# Patient Record
Sex: MALE | Race: WHITE | NOT HISPANIC OR LATINO | Employment: FULL TIME | URBAN - METROPOLITAN AREA
[De-identification: names, ages, dates, MRNs, and addresses within clinical notes are randomized per-mention and may not be internally consistent; named-entity substitution may affect disease eponyms.]

---

## 2020-06-04 ENCOUNTER — NURSE TRIAGE (OUTPATIENT)
Dept: OTHER | Facility: OTHER | Age: 25
End: 2020-06-04

## 2020-06-04 DIAGNOSIS — Z20.828 EXPOSURE TO SARS-ASSOCIATED CORONAVIRUS: ICD-10-CM

## 2020-06-04 DIAGNOSIS — Z20.828 EXPOSURE TO SARS-ASSOCIATED CORONAVIRUS: Primary | ICD-10-CM

## 2020-06-04 PROCEDURE — U0003 INFECTIOUS AGENT DETECTION BY NUCLEIC ACID (DNA OR RNA); SEVERE ACUTE RESPIRATORY SYNDROME CORONAVIRUS 2 (SARS-COV-2) (CORONAVIRUS DISEASE [COVID-19]), AMPLIFIED PROBE TECHNIQUE, MAKING USE OF HIGH THROUGHPUT TECHNOLOGIES AS DESCRIBED BY CMS-2020-01-R: HCPCS

## 2020-06-06 LAB — SARS-COV-2 RNA SPEC QL NAA+PROBE: NOT DETECTED

## 2020-06-14 ENCOUNTER — NURSE TRIAGE (OUTPATIENT)
Dept: OTHER | Facility: OTHER | Age: 25
End: 2020-06-14

## 2020-06-14 ENCOUNTER — APPOINTMENT (OUTPATIENT)
Dept: URGENT CARE | Age: 25
End: 2020-06-14
Payer: COMMERCIAL

## 2020-06-14 DIAGNOSIS — Z20.828 EXPOSURE TO SARS-ASSOCIATED CORONAVIRUS: Primary | ICD-10-CM

## 2020-06-14 DIAGNOSIS — Z20.828 EXPOSURE TO SARS-ASSOCIATED CORONAVIRUS: ICD-10-CM

## 2020-06-14 PROCEDURE — U0003 INFECTIOUS AGENT DETECTION BY NUCLEIC ACID (DNA OR RNA); SEVERE ACUTE RESPIRATORY SYNDROME CORONAVIRUS 2 (SARS-COV-2) (CORONAVIRUS DISEASE [COVID-19]), AMPLIFIED PROBE TECHNIQUE, MAKING USE OF HIGH THROUGHPUT TECHNOLOGIES AS DESCRIBED BY CMS-2020-01-R: HCPCS

## 2020-06-15 ENCOUNTER — TELEPHONE (OUTPATIENT)
Dept: OTHER | Facility: OTHER | Age: 25
End: 2020-06-15

## 2020-06-15 LAB — SARS-COV-2 RNA SPEC QL NAA+PROBE: DETECTED

## 2023-09-28 ENCOUNTER — APPOINTMENT (EMERGENCY)
Dept: RADIOLOGY | Facility: HOSPITAL | Age: 28
End: 2023-09-28
Payer: COMMERCIAL

## 2023-09-28 ENCOUNTER — APPOINTMENT (EMERGENCY)
Dept: MRI IMAGING | Facility: HOSPITAL | Age: 28
End: 2023-09-28
Payer: COMMERCIAL

## 2023-09-28 ENCOUNTER — HOSPITAL ENCOUNTER (EMERGENCY)
Facility: HOSPITAL | Age: 28
Discharge: HOME | End: 2023-09-29
Attending: EMERGENCY MEDICINE
Payer: COMMERCIAL

## 2023-09-28 DIAGNOSIS — C71.9 ANAPLASTIC ASTROCYTOMA (HCC): ICD-10-CM

## 2023-09-28 DIAGNOSIS — R53.1 ACUTE RIGHT-SIDED WEAKNESS: ICD-10-CM

## 2023-09-28 DIAGNOSIS — R47.89 WORD FINDING DIFFICULTY: ICD-10-CM

## 2023-09-28 DIAGNOSIS — R26.0 ATAXIC GAIT: Primary | ICD-10-CM

## 2023-09-28 DIAGNOSIS — R20.0 RIGHT ARM NUMBNESS: ICD-10-CM

## 2023-09-28 LAB
ALBUMIN SERPL BCP-MCNC: 4.8 G/DL (ref 3.5–5)
ALP SERPL-CCNC: 64 U/L (ref 34–104)
ALT SERPL W P-5'-P-CCNC: 20 U/L (ref 7–52)
ANION GAP SERPL CALCULATED.3IONS-SCNC: 7 MMOL/L
AST SERPL W P-5'-P-CCNC: 20 U/L (ref 13–39)
BASOPHILS # BLD AUTO: 0.05 THOUSANDS/ÂΜL (ref 0–0.1)
BASOPHILS NFR BLD AUTO: 1 % (ref 0–1)
BILIRUB SERPL-MCNC: 0.54 MG/DL (ref 0.2–1)
BUN SERPL-MCNC: 19 MG/DL (ref 5–25)
CALCIUM SERPL-MCNC: 9.9 MG/DL (ref 8.4–10.2)
CARDIAC TROPONIN I PNL SERPL HS: <2 NG/L
CHLORIDE SERPL-SCNC: 104 MMOL/L (ref 96–108)
CO2 SERPL-SCNC: 29 MMOL/L (ref 21–32)
CREAT SERPL-MCNC: 1.34 MG/DL (ref 0.6–1.3)
EOSINOPHIL # BLD AUTO: 0.23 THOUSAND/ÂΜL (ref 0–0.61)
EOSINOPHIL NFR BLD AUTO: 4 % (ref 0–6)
ERYTHROCYTE [DISTWIDTH] IN BLOOD BY AUTOMATED COUNT: 11.8 % (ref 11.6–15.1)
GFR SERPL CREATININE-BSD FRML MDRD: 70 ML/MIN/1.73SQ M
GLUCOSE SERPL-MCNC: 111 MG/DL (ref 65–140)
HCT VFR BLD AUTO: 42.6 % (ref 36.5–49.3)
HGB BLD-MCNC: 14.8 G/DL (ref 12–17)
IMM GRANULOCYTES # BLD AUTO: 0 THOUSAND/UL (ref 0–0.2)
IMM GRANULOCYTES NFR BLD AUTO: 0 % (ref 0–2)
LIPASE SERPL-CCNC: 7 U/L (ref 11–82)
LYMPHOCYTES # BLD AUTO: 1.79 THOUSANDS/ÂΜL (ref 0.6–4.47)
LYMPHOCYTES NFR BLD AUTO: 28 % (ref 14–44)
MCH RBC QN AUTO: 31 PG (ref 26.8–34.3)
MCHC RBC AUTO-ENTMCNC: 34.7 G/DL (ref 31.4–37.4)
MCV RBC AUTO: 89 FL (ref 82–98)
MONOCYTES # BLD AUTO: 0.58 THOUSAND/ÂΜL (ref 0.17–1.22)
MONOCYTES NFR BLD AUTO: 9 % (ref 4–12)
NEUTROPHILS # BLD AUTO: 3.81 THOUSANDS/ÂΜL (ref 1.85–7.62)
NEUTS SEG NFR BLD AUTO: 58 % (ref 43–75)
NRBC BLD AUTO-RTO: 0 /100 WBCS
PLATELET # BLD AUTO: 235 THOUSANDS/UL (ref 149–390)
PMV BLD AUTO: 10.8 FL (ref 8.9–12.7)
POTASSIUM SERPL-SCNC: 3.9 MMOL/L (ref 3.5–5.3)
PROT SERPL-MCNC: 7.3 G/DL (ref 6.4–8.4)
RBC # BLD AUTO: 4.78 MILLION/UL (ref 3.88–5.62)
SODIUM SERPL-SCNC: 140 MMOL/L (ref 135–147)
TSH SERPL DL<=0.05 MIU/L-ACNC: 1.53 UIU/ML (ref 0.45–4.5)
WBC # BLD AUTO: 6.46 THOUSAND/UL (ref 4.31–10.16)

## 2023-09-28 PROCEDURE — 36415 COLL VENOUS BLD VENIPUNCTURE: CPT

## 2023-09-28 PROCEDURE — 80053 COMPREHEN METABOLIC PANEL: CPT

## 2023-09-28 PROCEDURE — 85025 COMPLETE CBC W/AUTO DIFF WBC: CPT

## 2023-09-28 PROCEDURE — 70553 MRI BRAIN STEM W/O & W/DYE: CPT

## 2023-09-28 PROCEDURE — A9585 GADOBUTROL INJECTION: HCPCS

## 2023-09-28 PROCEDURE — 99285 EMERGENCY DEPT VISIT HI MDM: CPT

## 2023-09-28 PROCEDURE — 93005 ELECTROCARDIOGRAM TRACING: CPT

## 2023-09-28 PROCEDURE — 71046 X-RAY EXAM CHEST 2 VIEWS: CPT

## 2023-09-28 PROCEDURE — 83690 ASSAY OF LIPASE: CPT

## 2023-09-28 PROCEDURE — 84443 ASSAY THYROID STIM HORMONE: CPT

## 2023-09-28 PROCEDURE — 84484 ASSAY OF TROPONIN QUANT: CPT

## 2023-09-28 PROCEDURE — 99285 EMERGENCY DEPT VISIT HI MDM: CPT | Performed by: EMERGENCY MEDICINE

## 2023-09-28 RX ORDER — GADOBUTROL 604.72 MG/ML
9 INJECTION INTRAVENOUS
Status: COMPLETED | OUTPATIENT
Start: 2023-09-28 | End: 2023-09-28

## 2023-09-28 RX ADMIN — GADOBUTROL 9 ML: 604.72 INJECTION INTRAVENOUS at 20:42

## 2023-09-28 NOTE — ED PROVIDER NOTES
History  Chief Complaint   Patient presents with   • Chest Pain     Patient reports midsternal stabbing CP and right sided weakness in arm and leg since Monday. Other neuro intact     Gil Budmyrna is a 25 yom with history of prior grade 3, left sided anaplastic astrocytoma, s/p resection, chemotherapy, and radiation, with stable scans since 2016(and no further radiation or chemo since that time) who presents with cc loss of balance. States that for the past month, had increase in frequency in his usual headaches(accompanied by nausea). 5 days ago, developed significant balance issues, accompanied by articulation difficulty, right upper extremity weakness and numbness, and RLE weakness. Also endorses sharp, substernal chest pain which lasts a few seconds at a time, is random, not worse with movement or deep inspiration, not accompanied by diaphoresis, abdominal pain, nausea, vomiting, dyspnea, or unilateral calf pain or swelling, not currently present. Denies fever, chills, dizziness, tinnitus, dysphagia, neck pain or swelling, back pain, palpitations, diarrhea, constipation, urinary retention, bowel incontinence, rashes, recent travel, recent sick contacts, or falls/head injuries. No personal or family history of ACS, CVA, DVT, or PE. None       History reviewed. No pertinent past medical history. History reviewed. No pertinent surgical history. History reviewed. No pertinent family history. I have reviewed and agree with the history as documented. E-Cigarette/Vaping     E-Cigarette/Vaping Substances     Social History     Tobacco Use   • Smoking status: Never   • Smokeless tobacco: Never        Review of Systems   Constitutional: Negative. Negative for appetite change, chills, diaphoresis, fatigue, fever and unexpected weight change. HENT: Negative. Negative for congestion, facial swelling, rhinorrhea, sneezing, sore throat, tinnitus, trouble swallowing and voice change. Eyes: Negative. Negative for photophobia, pain, discharge, redness, itching and visual disturbance. Respiratory: Negative. Negative for cough, chest tightness and shortness of breath. Cardiovascular: Positive for chest pain. Negative for palpitations and leg swelling. Gastrointestinal: Negative. Negative for abdominal distention, abdominal pain, constipation, diarrhea, nausea and vomiting. Endocrine: Negative. Genitourinary: Negative. Musculoskeletal: Positive for gait problem. Negative for arthralgias, back pain, myalgias, neck pain and neck stiffness. Skin: Negative. Negative for pallor, rash and wound. Allergic/Immunologic: Negative. Negative for immunocompromised state. Neurological: Positive for speech difficulty, weakness, numbness and headaches. Negative for dizziness, tremors, seizures, syncope, facial asymmetry and light-headedness. Hematological: Does not bruise/bleed easily. Psychiatric/Behavioral: Negative for confusion. All other systems reviewed and are negative. Physical Exam  ED Triage Vitals   Temperature Pulse Respirations Blood Pressure SpO2   09/28/23 1611 09/28/23 1611 09/28/23 1611 09/28/23 1611 09/28/23 1611   98.6 °F (37 °C) 70 18 146/70 99 %      Temp Source Heart Rate Source Patient Position - Orthostatic VS BP Location FiO2 (%)   09/28/23 1611 09/28/23 1611 09/28/23 1611 09/28/23 1611 --   Oral Monitor Sitting Right arm       Pain Score       09/28/23 2110       No Pain             Orthostatic Vital Signs  Vitals:    09/28/23 1611 09/28/23 1924 09/28/23 2110 09/28/23 2215   BP: 146/70 126/61 128/75 128/73   Pulse: 70 61 56 61   Patient Position - Orthostatic VS: Sitting Sitting Sitting Sitting       Physical Exam  Vitals and nursing note reviewed. Constitutional:       General: He is not in acute distress. Appearance: He is not diaphoretic. HENT:      Head: Normocephalic and atraumatic. Eyes:      General: No visual field deficit.      Extraocular Movements: Extraocular movements intact. Pupils: Pupils are equal, round, and reactive to light. Cardiovascular:      Rate and Rhythm: Normal rate and regular rhythm. No extrasystoles are present. Pulses:           Radial pulses are 2+ on the right side and 2+ on the left side. Dorsalis pedis pulses are 2+ on the right side and 2+ on the left side. Heart sounds: Normal heart sounds. No murmur heard. No friction rub. No gallop. Pulmonary:      Effort: Pulmonary effort is normal. No tachypnea, accessory muscle usage or respiratory distress. Breath sounds: Normal breath sounds. No decreased breath sounds, wheezing, rhonchi or rales. Chest:      Chest wall: No tenderness. Abdominal:      General: Bowel sounds are normal.      Palpations: Abdomen is soft. Tenderness: There is no guarding or rebound. Musculoskeletal:         General: Normal range of motion. Cervical back: Normal range of motion and neck supple. Right lower leg: No tenderness. No edema. Left lower leg: No tenderness. No edema. Skin:     General: Skin is warm and dry. Capillary Refill: Capillary refill takes less than 2 seconds. Coloration: Skin is not cyanotic or pale. Findings: No ecchymosis or rash. Neurological:      Mental Status: He is alert and oriented to person, place, and time. GCS: GCS eye subscore is 4. GCS verbal subscore is 5. GCS motor subscore is 6. Cranial Nerves: No dysarthria or facial asymmetry. Sensory: Sensory deficit present. Motor: No weakness, tremor, abnormal muscle tone or pronator drift. Coordination: Romberg sign positive. Coordination normal. Finger-Nose-Finger Test and Heel to [facility] Test normal.      Gait: Gait abnormal.      Comments: Difficulty with word finding, however, no dysarthria. Tongue and uvula midline. Decreased sharp/dull discrimination in entire RUE, however proprioception and temperature intact, 2+ radial pulse present. Remainder of extremities neurovascularly intact. No objective weakness on strength testing of bilateral upper and lower extremities. Ataxic gait.    Psychiatric:         Mood and Affect: Mood normal.         Behavior: Behavior normal.         ED Medications  Medications   Gadobutrol injection (SINGLE-DOSE) SOLN 9 mL (9 mL Intravenous Given 9/28/23 2042)       Diagnostic Studies  Results Reviewed     Procedure Component Value Units Date/Time    Lipase [[id number]]  (Abnormal) Collected: 09/28/23 1703    Lab Status: Final result Specimen: Blood from Arm, Right Updated: 09/28/23 1830     Lipase 7 u/L     TSH, 3rd generation with Free T4 reflex [[id number]]  (Normal) Collected: 09/28/23 1703    Lab Status: Final result Specimen: Blood from Arm, Right Updated: 09/28/23 1747     TSH 3RD GENERATON 1.527 uIU/mL     HS Troponin 0hr (reflex protocol) [[id number]]  (Normal) Collected: 09/28/23 1703    Lab Status: Final result Specimen: Blood from Arm, Right Updated: 09/28/23 1739     hs TnI 0hr <2 ng/L     Comprehensive metabolic panel [[id number]]  (Abnormal) Collected: 09/28/23 1703    Lab Status: Final result Specimen: Blood from Arm, Right Updated: 09/28/23 1734     Sodium 140 mmol/L      Potassium 3.9 mmol/L      Chloride 104 mmol/L      CO2 29 mmol/L      ANION GAP 7 mmol/L      BUN 19 mg/dL      Creatinine 1.34 mg/dL      Glucose 111 mg/dL      Calcium 9.9 mg/dL      AST 20 U/L      ALT 20 U/L      Alkaline Phosphatase 64 U/L      Total Protein 7.3 g/dL      Albumin 4.8 g/dL      Total Bilirubin 0.54 mg/dL      eGFR 70 ml/min/1.73sq m     Narrative:      [place] guidelines for Chronic Kidney Disease (CKD):   •  Stage 1 with normal or high GFR (GFR > 90 mL/min/1.73 square meters)  •  Stage 2 Mild CKD (GFR = 60-89 mL/min/1.73 square meters)  •  Stage 3A Moderate CKD (GFR = 45-59 mL/min/1.73 square meters)  •  Stage 3B Moderate CKD (GFR = 30-44 mL/min/1.73 square meters)  •  Stage 4 Severe CKD (GFR = 15-29 mL/min/1.73 square meters)  •  Stage 5 End Stage CKD (GFR <15 mL/min/1.73 square meters)  Note: GFR calculation is accurate only with a steady state creatinine    CBC and differential [[id number]] Collected: 09/28/23 1703    Lab Status: Final result Specimen: Blood from Arm, Right Updated: 09/28/23 1719     WBC 6.46 Thousand/uL      RBC 4.78 Million/uL      Hemoglobin 14.8 g/dL      Hematocrit 42.6 %      MCV 89 fL      MCH 31.0 pg      MCHC 34.7 g/dL      RDW 11.8 %      MPV 10.8 fL      Platelets 566 Thousands/uL      nRBC 0 /100 WBCs      Neutrophils Relative 58 %      Immat GRANS % 0 %      Lymphocytes Relative 28 %      Monocytes Relative 9 %      Eosinophils Relative 4 %      Basophils Relative 1 %      Neutrophils Absolute 3.81 Thousands/µL      Immature Grans Absolute 0.00 Thousand/uL      Lymphocytes Absolute 1.79 Thousands/µL      Monocytes Absolute 0.58 Thousand/µL      Eosinophils Absolute 0.23 Thousand/µL      Basophils Absolute 0.05 Thousands/µL                  MRI brain w wo contrast   Final Result by Kerline Aguirre MD (09/28 2219)         1. Postsurgical change from prior resection of left frontoparietal tumor. Surrounding signal abnormality and gyral swelling with sulcal lacelike and nodular enhancement extending throughout the corpus callosum consistent with known history of anaplastic    astrocytoma. Findings concerning for disease progression however evaluation is significantly limited in the absence of prior exams for comparison. Recommend neurosurgical consultation. 2. Mild hydrocephalus. Possible transependymal flow of CSF. The study was marked in [facility] for immediate notification. Workstation performed: [id number]         XR chest 2 views   ED Interpretation by Naveed Clarke MD (09/28 8397)   NAD.             Procedures  ECG 12 Lead Documentation Only    Date/Time: 9/28/2023 4:13 PM    Performed by: Saida Canada DO  Authorized by: King Eder Monkton Shakeel, DO    Patient location:  ED  Previous ECG:     Previous ECG:  Unavailable  Interpretation:     Interpretation: normal    Rate:     ECG rate:  64    ECG rate assessment: normal    Rhythm:     Rhythm: sinus rhythm    Ectopy:     Ectopy: none    QRS:     QRS axis:  Normal    QRS intervals:  Normal  Conduction:     Conduction: normal    ST segments:     ST segments:  Normal  T waves:     T waves: normal            ED Course  ED Course as of 09/29/23 0028   u Sep 28, 2023   1704 ECG NSR, rate 64, without evidence of ectopy, ischemia, infarct, arrhythmia, or abnormal interval.    1736 Creatinine(!): 1.34  Mildly elevated, however no baseline for comparison. Remainder of CMP WNL. 1736 CBC and differential  Unremarkable. 1744 hs TnI 0hr: <2  Doubt ACS given normal ECG, intermittent pain x 1 week. Will cancel 2 and 4 hour repeats. 1752 TSH 3RD GENERATON: 1.527  WNL. 1832 Lipase(!): 7  Doubt pancreatitis. 1915 MRI scheduled for 2000. Pt updated on test results, delay to MRI. Is resting comfortably, in no distress, all questions answered. 2231 MRI brain w wo contrast  IMPRESSION:        1. Postsurgical change from prior resection of left frontoparietal tumor. Surrounding signal abnormality and gyral swelling with sulcal lacelike and nodular enhancement extending throughout the corpus callosum consistent with known history of anaplastic   astrocytoma. Findings concerning for disease progression however evaluation is significantly limited in the absence of prior exams for comparison. Recommend neurosurgical consultation. 2. Mild hydrocephalus. Possible transependymal flow of CSF.   2314 Reached out to [facility] for potential transfer to [facility] given patient is followed by oncology and neurosurgery there and is requesting to be admitted there. Pending callback. Fri Sep 29, 2023   0005 Accepted by [facility] admission by hospitalist Dr. Suzan Wharton, however no beds available.    8242 Case signed out to  Noahliz Bailon pending transport. Pt updated that transfer will be delayed due to lack of open beds at [facility]. Diet ordered, not on home meds. HEART Risk Score    Flowsheet Row Most Recent Value   Heart Score Risk Calculator    History 0 Filed at: 09/28/2023 1745   ECG 0 Filed at: 09/28/2023 1745   Age 0 Filed at: 09/28/2023 1745   Risk Factors 0 Filed at: 09/28/2023 1745   Troponin 0 Filed at: 09/28/2023 1745   HEART Score 0 Filed at: 09/28/2023 435 [employer]  Patient is a 78-year-old male with history of anaplastic astrocytoma, prior resection, chemotherapy, and radiation in 2015, with stable CT scans since 2016, who presents with chief complaint of difficulty walking, also has right-sided weakness, right arm numbness, and difficulty word finding. Is also endorsing intermittent chest pain for the past week, not currently present. Vital signs within normal limits, physical exam as above, no significant concern due to gait ataxia and positive Romberg. No objective weakness on exam, however does have decreased sharp versus dull discrimination in right upper extremity. Chest nontender, abdomen soft nontender, lung sounds clear to auscultation bilaterally, no peripheral edema or unilateral calf pain or swelling. As for chest pain, could be musculoskeletal, however cannot exclude arrhythmia, ischemia, or abnormal interval.  ECG, CXR, and troponin ordered. Doubt PE. As far as neurologic symptoms, no neck or back pain to suggest impingement. In setting of history of brain cancer, significant concern exists for recurrence of same, less likely hemorrhage or subacute stroke. We will also obtain electrolytes. Will contact radiology for emergent MRI brain. Regardless of MRI findings, will need to be admitted for further neurological work-up given significant ataxia with gait. See ED course for remainder of MDM.     Ataxic gait: acute illness or injury  Amount and/or Complexity of Data Reviewed  Labs: ordered. Decision-making details documented in ED Course. Radiology: ordered and independent interpretation performed. Decision-making details documented in ED Course. Risk  Prescription drug management. 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[address])   Acute right-sided weakness   Right arm numbness   Word finding difficulty     Time reflects when diagnosis was documented in both MDM as applicable and the Disposition within this note     Time User Action Codes Description Comment    9/28/2023 11:29 PM P[place]is [place] Add [R26.0] Ataxic gait     9/28/2023 11:29 PM [place] Add [C71.9] Anaplastic astrocytoma ([address])     9/28/2023 11:51 PM Zo Parcel [R53.1] Acute right-sided weakness     9/28/2023 11:51 PM [place] Add [R20.0] Right arm numbness     9/28/2023 11:51 PM [place] Add [R47.89] Word finding difficulty       ED Disposition     ED Disposition   Transfer to Another Facility-In Network    Condition   --    Date/Time   Thu Sep 28, 2023 11:29 PM    Betsy. should be transferred out to [facility].            MD Documentation    Casper Osorio Most Recent Value   Patient Condition The patient has been stabilized such that within reasonable medical probability, no material deterioration of the patient condition or the condition of the unborn child(keon) is likely to result from the transfer   Benefits of Transfer Specialized equipment and/or services available at the receiving facility (Include comment)________________________  [Neurosurgery, oncology, is followed by [facility] oncology and neurosurgery]   Risks of Transfer Potential for delay in receiving treatment, Potential deterioration of medical condition, Increased discomfort during transfer   Accepting Physician Dr. Abe Skaggs Name, [address] [facility]    (Name & Tel number) Karla Yeung Sending MD LAZARO Mason/Jun   Provider Certification General risk, such as traffic hazards, adverse weather conditions, rough terrain or turbulence, possible failure of equipment (including vehicle or aircraft), or consequences of actions of persons outside the control of the transport personnel, Unanticipated needs of medical equipment and personnel during transport, Risk of worsening condition, The possibility of a transport vehicle being unavailable      RN Documentation    [address] Name, [address] [facility]    (Name & Tel number) Florentin Mauro      Follow-up Information    None         Patient's Medications    No medications on file     No discharge procedures on file. PDMP Review     None           ED Provider  Attending physically available and evaluated Mikie Smith. . I managed the patient along with the ED Attending.     Electronically Signed by         Isaac Murphy DO  09/29/23 0028

## 2023-09-29 VITALS
DIASTOLIC BLOOD PRESSURE: 66 MMHG | OXYGEN SATURATION: 99 % | SYSTOLIC BLOOD PRESSURE: 124 MMHG | RESPIRATION RATE: 16 BRPM | HEART RATE: 52 BPM | TEMPERATURE: 98.6 F

## 2023-09-29 LAB
ATRIAL RATE: 64 BPM
P AXIS: 54 DEGREES
PR INTERVAL: 138 MS
QRS AXIS: 83 DEGREES
QRSD INTERVAL: 96 MS
QT INTERVAL: 396 MS
QTC INTERVAL: 408 MS
T WAVE AXIS: 49 DEGREES
VENTRICULAR RATE: 64 BPM

## 2023-09-29 PROCEDURE — 93010 ELECTROCARDIOGRAM REPORT: CPT | Performed by: INTERNAL MEDICINE

## 2023-09-29 NOTE — EMTALA/ACUTE CARE TRANSFER
[address] [address]  Dept: [phone]      EMTALA TRANSFER CONSENT    NAME Rola Estevez.  1995                              MRN [id number]    I have been informed of my rights regarding examination, treatment, and transfer   by Dr. Katie Chávez MD    Benefits: Specialized equipment and/or services available at the receiving facility (Include comment)________________________ (Neurosurgery, oncology, is followed by [facility] oncology and neurosurgery)    Risks: Potential for delay in receiving treatment, Potential deterioration of medical condition, Increased discomfort during transfer      Consent for Transfer:  I acknowledge that my medical condition has been evaluated and explained to me by the emergency department physician or other qualified medical person and/or my attending physician, who has recommended that I be transferred to the service of  Accepting Physician: Dr. Mp Lugo at [address] [place] [address] [address] Name, [address] : [facility]. The above potential benefits of such transfer, the potential risks associated with such transfer, and the probable risks of not being transferred have been explained to me, and I fully understand them. The doctor has explained that, in my case, the benefits of transfer outweigh the risks. I agree to be transferred. I authorize the performance of emergency medical procedures and treatments upon me in both transit and upon arrival at the receiving facility. Additionally, I authorize the release of any and all medical records to the receiving facility and request they be transported with me, if possible. I understand that the safest mode of transportation during a medical emergency is an ambulance and that the Hospital advocates the use of this mode of transport.  Risks of traveling to the receiving facility by car, including absence of medical control, life sustaining equipment, such as oxygen, and medical personnel has been explained to me and I fully understand them. (MICHAEL CORRECT BOX BELOW)  [  ]  I consent to the stated transfer and to be transported by ambulance/helicopter. [  ]  I consent to the stated transfer, but refuse transportation by ambulance and accept full responsibility for my transportation by car. I understand the risks of non-ambulance transfers and I exonerate the Hospital and its staff from any deterioration in my condition that results from this refusal.    X___________________________________________    DATE  23  TIME________  Signature of patient or legally responsible individual signing on patient behalf           RELATIONSHIP TO PATIENT_________________________          Provider Certification    NAME Rola Estevez.  1995                              MRN [id number]    A medical screening exam was performed on the above named patient. Based on the examination:    Condition Necessitating Transfer The primary encounter diagnosis was Ataxic gait. Diagnoses of Anaplastic astrocytoma ([address]), Acute right-sided weakness, Right arm numbness, and Word finding difficulty were also pertinent to this visit.     Patient Condition: The patient has been stabilized such that within reasonable medical probability, no material deterioration of the patient condition or the condition of the unborn child(keon) is likely to result from the transfer    Reason for Transfer:      Transfer Requirements: Facility [facility]   · Space available and qualified personnel available for treatment as acknowledged by Colten Camarena  · Agreed to accept transfer and to provide appropriate medical treatment as acknowledged by       Dr. Mp Lugo  · Appropriate medical records of the examination and treatment of the patient are provided at the time of transfer   [address] _______  · Transfer will be performed by qualified personnel from    and appropriate transfer equipment as required, including the use of necessary and appropriate life support measures. Provider Certification: I have examined the patient and explained the following risks and benefits of being transferred/refusing transfer to the patient/family:  General risk, such as traffic hazards, adverse weather conditions, rough terrain or turbulence, possible failure of equipment (including vehicle or aircraft), or consequences of actions of persons outside the control of the transport personnel, Unanticipated needs of medical equipment and personnel during transport, Risk of worsening condition, The possibility of a transport vehicle being unavailable      Based on these reasonable risks and benefits to the patient and/or the unborn child(keon), and based upon the information available at the time of the patient’s examination, I certify that the medical benefits reasonably to be expected from the provision of appropriate medical treatments at another medical facility outweigh the increasing risks, if any, to the individual’s medical condition, and in the case of labor to the unborn child, from effecting the transfer.     X____________________________________________ DATE 09/29/23        TIME_______      ORIGINAL - SEND TO MEDICAL RECORDS   COPY - SEND WITH PATIENT DURING TRANSFER

## 2023-09-29 NOTE — ED CARE HANDOFF
Emergency Department Sign Out Note        Sign out and transfer of care from Dr. Josefina Huizar. See Separate Emergency Department note. The patient, Rahul Diaz., was evaluated by the previous provider for chest pain as well as new neurological deficits/symptomology in the setting of previous intracranial neoplasm. .    Workup Completed:  Patient's work-up in the emergency department included CBC, CMP, troponin, lipase, TSH, EKG, chest x-ray and MRI imaging. This was as commented on previous emergency department provider (Dr. Josefina Huizar who had received signout from Dr. Shaquille Griffin upon initial assessment for this patient). ED Course / Workup Pending (followup): Patient was awaiting transportation overnight (9/28 into 9/29) for transfer to [address] [place] [address] for further evaluation and monitoring of symptomology  Patient was still awaiting transportation to this hospital network as there was no of bed availability overnight and remains to have no bed availability throughout the course of the day. HEART Risk Score    Flowsheet Row Most Recent Value   Heart Score Risk Calculator    History 0 Filed at: 09/28/2023 1745   ECG 0 Filed at: 09/28/2023 1745   Age 0 Filed at: 09/28/2023 1745   Risk Factors 0 Filed at: 09/28/2023 1745   Troponin 0 Filed at: 09/28/2023 1745   HEART Score 0 Filed at: 09/28/2023 1745                                  ED Course as of 09/29/23 1755   Fri Sep 29, 2023   0745 Patient sign out received from Dr. Josefina Huizar; patient awaiting transport for patient back to [facility] secondary to worsening brain pathology. 3314 Introduced myself to the patient. Patient is in good spirits and is accompanied by significant other. Patient with no acute complaints. Patient still awaiting transportation to [facility]. [address] with patient transfer center, still awaiting bed for [address] [place] [address]. Anticipated further communication later in the afternoon.      Procedures  Medical Decision Making  I continue to have frequent conversations with the patient  as well as [facility] with regards to updated timeframe for bed availability. Unfortunately, bed availability was still nonexistent and was unable to transfer the patient or provide an estimated time for transportation for the patient. Patient understandably was unsatisfied with transportation time and did not wish to stay indefinitely in the emergency department if there was no timeframe for transportation to [facility]. I had expressed to the patient that based off of the MRI imaging that was indicative of worsening cranial pathology, that the patient would be leaving [address] [place] [address] as I do feel strongly that the patient should remain in the emergency department for transport to the [facility]. However patient was able to express significant insight into his pathology as well as risks and benefits of leaving earlier from the hospital and understood that he did run the risk of potential death, severe neurological impairment, permanent morbidity, permanent disability, or worsening neurological sequelae resulting in diminished quality of life. He stated that he feels that his symptomology has remained consistent during stay in the emergency department and does not wish to stay any longer and although understanding the previous risks, felt it was within his capacity to leave [address] [place] [address] and either transport himself to the appropriate hospital for further evaluation and monitoring at that time or follow-up with his outpatient providers with regards to his intracranial pathology.   Once again it was impressed upon the patient significant other and family member who are present at the bedside that the patient would be leaving [address] [place] [address] and all were in agreement that they were acceptable with this plan and would proceed with signing the documentation in order to leave the emergency department. There is documentation was provided to the patient, explained at length, and impressed upon the patient that if there is any changes in his symptomology he is always welcome to return to the emergency department for further evaluation of his symptomology. 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 Details: Please see further documentation of work-up by previous providers. Grossly unremarkable. Radiology: ordered and independent interpretation performed. Details: MRI imaging consistent with changes to intracranial mass. Was initially for transportation to [facility] but due to the increased amount of time and no clear etiology for bed placement, patient signed out [facility]. Risk  Prescription drug management.               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  Admitted: 9/28/2023  4:27 PM  Attending Provider: DO Darrell Marcus. or his authorized caregiver has made the decision for the patient to leave the emergency department again st the advice of his attending physician. He or his authorized caregiver has been informed and understands the inherent risks, including death, permanent disability, worsening neurological symptoms, irreversible loss of sensation, breathing, balance,  or motor function. He or his authorized caregiver has decided to accept the responsibility for this decision. Meri Garvey and all necessary parties have been advised that he may return for further evaluation or treatment. His condition at  time of discharge was stable. Meri Garvey had current vital signs as follows:  /66 (BP Location: Right arm)   Pulse (!) 52   Temp 98.6 °F (37 °C) (Oral)   Resp 16          MD Documentation    Flowsheet Row Most Recent Value   Patient Condition The patient has been stabilized such that within reasonable medical probability, no material deterioration of the patient condition or the condition of the unborn child(keon) is likely to result from the transfer   Benefits of Transfer Specialized equipment and/or services available at the receiving facility (Include comment)________________________  [Neurosurgery, oncology, is followed by [facility] oncology and neurosurgery]   Risks of Transfer Potential for delay in receiving treatment, Potential deterioration of medical condition, Increased discomfort during transfer   Accepting Physician Dr. Angie Godoy Name, Alyssa Rudolph    (Name & Tel number) Xander Courser   Sending MD LAZARO Mason/Jun   Provider Certification General risk, such as traffic hazards, adverse weather conditions, rough terrain or turbulence, possible failure of equipment (including vehicle or aircraft), or consequences of actions of persons outside the control of the transport personnel, Unanticipated needs of medical equipment and personnel during transport, Risk of worsening condition, The possibility of a transport vehicle being unavailable      RN Documentation    [address] Name, [address] [facility]    (Name & Tel number) Jeanien Mohs      Follow-up Information    None       There are no discharge medications for this patient. No discharge procedures on file.        ED Provider  Electronically Signed by     Pola Moritz, MD  09/29/23 7520

## 2023-09-29 NOTE — DISCHARGE INSTRUCTIONS
Return to the Emergency Department sooner if increased pain, fever, vomiting, difficulty breathing, rash. Continue to follow-up with your neurosurgical team as well as please transport herself to the [facility] for transportation for any worsening or development of symptoms.

## 2023-09-29 NOTE — ED CARE HANDOFF
Emergency Department Sign Out Note        Sign out and transfer of care from Dr. Hina Tan. See Separate Emergency Department note. The patient, Armani Green., was evaluated by the previous provider for chest pain. Workup Completed:  CBC, CMP, troponin, lipase, TSH, EKG, CXR, MRI brain    ED Course / Workup Pending (followup): Patient with known brain cancer was found to have possible progression of disease on MRI. Due to this, oncology was consulted at [facility] due to having previous care there. They recommend transfer and patient is currently pending transfer to [facility] upon signout. Patient signed out to Dr. Barbara Acosta as he is still pending transfer. HEART Risk Score    Flowsheet Row Most Recent Value   Heart Score Risk Calculator    History 0 Filed at: 09/28/2023 1745   ECG 0 Filed at: 09/28/2023 1745   Age 0 Filed at: 09/28/2023 1745   Risk Factors 0 Filed at: 09/28/2023 1745   Troponin 0 Filed at: 09/28/2023 1745   HEART Score 0 Filed at: 09/28/2023 1745                                  ED Course as of 09/29/23 0831   Fri Sep 29, 2023   0012 SO - Brain cancer, 1 month headaches worsening, 5 days word finding diff, right sided neuro deficits. Intermittent CP no SOB. CP workup negative. MRI showed likely progression of disease. Pending transport to [facility] where his oncologist is. Diet is in.      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 should be transferred out to [facility]. MD Documentation    Elliott Richardson Most Recent Value   Patient Condition The patient has been stabilized such that within reasonable medical probability, no material deterioration of the patient condition or the condition of the unborn child(keon) is likely to result from the transfer   Benefits of Transfer Specialized equipment and/or services available at the receiving facility (Include comment)________________________  [Neurosurgery, oncology, is followed by [facility] oncology and neurosurgery]   Risks of Transfer Potential for delay in receiving treatment, Potential deterioration of medical condition, Increased discomfort during transfer   Accepting Physician Dr. Mylene Grant Name, [facility]    (Name & Tel number) Gabriel Garcia   Sending MD LAZARO Mason/Jun   Provider Certification General risk, such as traffic hazards, adverse weather conditions, rough terrain or turbulence, possible failure of equipment (including vehicle or aircraft), or consequences of actions of persons outside the control of the transport personnel, Unanticipated needs of medical equipment and personnel during transport, Risk of worsening condition, The possibility of a transport vehicle being unavailable      RN Documentation    [address] Name, [address] [facility]    (Name & Tel number) Gabriel Garcia      Follow-up Information    None       Patient's Medications    No medications on file     No discharge procedures on file.        ED Provider  Electronically Signed by     Ralph Hamilton DO  09/29/23 0831

## 2023-09-29 NOTE — ED NOTES
Pt awake. Vitals Stable. No update on time for  yet. Calling in breakfast tray.      Susie Shields RN  09/29/23 1344

## 2023-09-30 NOTE — ED ATTENDING ATTESTATION
9/28/2023  IRaissa MD, saw and evaluated the patient. I have discussed the patient with the resident/non-physician practitioner and agree with the resident's/non-physician practitioner's findings, Plan of Care, and MDM as documented in the resident's/non-physician practitioner's note, except where noted. All available labs and Radiology studies were reviewed. I was present for key portions of any procedure(s) performed by the resident/non-physician practitioner and I was immediately available to provide assistance. At this point I agree with the current assessment done in the Emergency Department. I have conducted an independent evaluation of this patient a history and physical is as follows: Patient with ataxia and right sided weakness. History of astrocytoma. MRI obtained which shows worsening disease progression. No cerebral edema. Alert and oriented x3. Chest pain work-up negative in emergency department. Patient to transfer to [facility] for continuity of care given patient's neurosurgeon and oncologist ago station unable to evaluate. Patient excepted for transfer, [facility] does not have any available beds at this time, therefore patient In the emergency department until transfer can be arranged.     Results Reviewed     Procedure Component Value Units Date/Time    Lipase [[id number]]  (Abnormal) Collected: 09/28/23 1703    Lab Status: Final result Specimen: Blood from Arm, Right Updated: 09/28/23 1830     Lipase 7 u/L     TSH, 3rd generation with Free T4 reflex [[id number]]  (Normal) Collected: 09/28/23 1703    Lab Status: Final result Specimen: Blood from Arm, Right Updated: 09/28/23 1747     TSH 3RD GENERATON 1.527 uIU/mL     HS Troponin 0hr (reflex protocol) [[id number]]  (Normal) Collected: 09/28/23 1703    Lab Status: Final result Specimen: Blood from Arm, Right Updated: 09/28/23 1739     hs TnI 0hr <2 ng/L     Comprehensive metabolic panel [[id number]]  (Abnormal) Collected: 09/28/23 1703    Lab Status: Final result Specimen: Blood from Arm, Right Updated: 09/28/23 1734     Sodium 140 mmol/L      Potassium 3.9 mmol/L      Chloride 104 mmol/L      CO2 29 mmol/L      ANION GAP 7 mmol/L      BUN 19 mg/dL      Creatinine 1.34 mg/dL      Glucose 111 mg/dL      Calcium 9.9 mg/dL      AST 20 U/L      ALT 20 U/L      Alkaline Phosphatase 64 U/L      Total Protein 7.3 g/dL      Albumin 4.8 g/dL      Total Bilirubin 0.54 mg/dL      eGFR 70 ml/min/1.73sq m     Narrative:      National Kid[employer] Disease Foundation guidelines for Chronic Kidney Disease (CKD):   •  Stage 1 with normal or high GFR (GFR > 90 mL/min/1.73 square meters)  •  Stage 2 Mild CKD (GFR = 60-89 mL/min/1.73 square meters)  •  Stage 3A Moderate CKD (GFR = 45-59 mL/min/1.73 square meters)  •  Stage 3B Moderate CKD (GFR = 30-44 mL/min/1.73 square meters)  •  Stage 4 Severe CKD (GFR = 15-29 mL/min/1.73 square meters)  •  Stage 5 End Stage CKD (GFR <15 mL/min/1.73 square meters)  Note: GFR calculation is accurate only with a steady state creatinine    CBC and differential [[id number]] Collected: 09/28/23 1703    Lab Status: Final result Specimen: Blood from Arm, Right Updated: 09/28/23 1719     WBC 6.46 Thousand/uL      RBC 4.78 Million/uL      Hemoglobin 14.8 g/dL      Hematocrit 42.6 %      MCV 89 fL      MCH 31.0 pg      MCHC 34.7 g/dL      RDW 11.8 %      MPV 10.8 fL      Platelets 288 Thousands/uL      nRBC 0 /100 WBCs      Neutrophils Relative 58 %      Immat GRANS % 0 %      Lymphocytes Relative 28 %      Monocytes Relative 9 %      Eosinophils Relative 4 %      Basophils Relative 1 %      Neutrophils Absolute 3.81 Thousands/µL      Immature Grans Absolute 0.00 Thousand/uL      Lymphocytes Absolute 1.79 Thousands/µL      Monocytes Absolute 0.58 Thousand/µL      Eosinophils Absolute 0.23 Thousand/µL      Basophils Absolute 0.05 Thousands/µL         MRI brain w wo contrast   Final Result by Jorge Fuentes MD (09/28 5385)         1. Postsurgical change from prior resection of left frontoparietal tumor. Surrounding signal abnormality and gyral swelling with sulcal lacelike and nodular enhancement extending throughout the corpus callosum consistent with known history of anaplastic    astrocytoma. Findings concerning for disease progression however evaluation is significantly limited in the absence of prior exams for comparison. Recommend neurosurgical consultation. 2. Mild hydrocephalus. Possible transependymal flow of CSF. The study was marked in [facility] for immediate notification. Workstation performed: [id number]         XR chest 2 views   ED Interpretation by Laine Castellon MD (09/28 9847)   NAD. Final Result by Candelario Veronica DO (09/29 2501)      Suspected nondisplaced rib fractures of left ribs 3-5, age-indeterminate. These could be further evaluated with dedicated rib series. This study was marked for "Immediate" notification in [employer].                         Workstation performed: [id number]               ED Course         Critical Care Time  Procedures